# Patient Record
Sex: MALE | Race: ASIAN | NOT HISPANIC OR LATINO | URBAN - METROPOLITAN AREA
[De-identification: names, ages, dates, MRNs, and addresses within clinical notes are randomized per-mention and may not be internally consistent; named-entity substitution may affect disease eponyms.]

---

## 2020-02-21 PROBLEM — Z00.00 ENCOUNTER FOR PREVENTIVE HEALTH EXAMINATION: Status: ACTIVE | Noted: 2020-02-21

## 2020-02-24 ENCOUNTER — INPATIENT (INPATIENT)
Facility: HOSPITAL | Age: 60
LOS: 2 days | Discharge: HOME | DRG: 300 | End: 2020-02-27
Attending: INTERNAL MEDICINE | Admitting: INTERNAL MEDICINE
Payer: COMMERCIAL

## 2020-02-24 VITALS
OXYGEN SATURATION: 98 % | HEIGHT: 71 IN | HEART RATE: 74 BPM | DIASTOLIC BLOOD PRESSURE: 89 MMHG | RESPIRATION RATE: 18 BRPM | TEMPERATURE: 98 F | WEIGHT: 182.98 LBS | SYSTOLIC BLOOD PRESSURE: 161 MMHG

## 2020-02-24 DIAGNOSIS — I82.409 ACUTE EMBOLISM AND THROMBOSIS OF UNSPECIFIED DEEP VEINS OF UNSPECIFIED LOWER EXTREMITY: ICD-10-CM

## 2020-02-24 LAB
ALBUMIN SERPL ELPH-MCNC: 4.3 G/DL — SIGNIFICANT CHANGE UP (ref 3.3–5)
ALP SERPL-CCNC: 146 U/L — HIGH (ref 40–120)
ALT FLD-CCNC: 41 U/L — SIGNIFICANT CHANGE UP (ref 10–45)
ANION GAP SERPL CALC-SCNC: 15 MMOL/L — SIGNIFICANT CHANGE UP (ref 5–17)
APTT BLD: 37 SEC — HIGH (ref 27.5–36.3)
AST SERPL-CCNC: 18 U/L — SIGNIFICANT CHANGE UP (ref 10–40)
BASOPHILS # BLD AUTO: 0.05 K/UL — SIGNIFICANT CHANGE UP (ref 0–0.2)
BASOPHILS NFR BLD AUTO: 0.6 % — SIGNIFICANT CHANGE UP (ref 0–2)
BILIRUB SERPL-MCNC: 0.3 MG/DL — SIGNIFICANT CHANGE UP (ref 0.2–1.2)
BLD GP AB SCN SERPL QL: NEGATIVE — SIGNIFICANT CHANGE UP
BUN SERPL-MCNC: 13 MG/DL — SIGNIFICANT CHANGE UP (ref 7–23)
CALCIUM SERPL-MCNC: 9.8 MG/DL — SIGNIFICANT CHANGE UP (ref 8.4–10.5)
CHLORIDE SERPL-SCNC: 99 MMOL/L — SIGNIFICANT CHANGE UP (ref 96–108)
CO2 SERPL-SCNC: 24 MMOL/L — SIGNIFICANT CHANGE UP (ref 22–31)
CREAT SERPL-MCNC: 0.77 MG/DL — SIGNIFICANT CHANGE UP (ref 0.5–1.3)
EOSINOPHIL # BLD AUTO: 0.2 K/UL — SIGNIFICANT CHANGE UP (ref 0–0.5)
EOSINOPHIL NFR BLD AUTO: 2.4 % — SIGNIFICANT CHANGE UP (ref 0–6)
GLUCOSE SERPL-MCNC: 111 MG/DL — HIGH (ref 70–99)
HCT VFR BLD CALC: 47.1 % — SIGNIFICANT CHANGE UP (ref 39–50)
HGB BLD-MCNC: 14.8 G/DL — SIGNIFICANT CHANGE UP (ref 13–17)
IMM GRANULOCYTES NFR BLD AUTO: 0.2 % — SIGNIFICANT CHANGE UP (ref 0–1.5)
INR BLD: 0.92 RATIO — SIGNIFICANT CHANGE UP (ref 0.88–1.16)
LYMPHOCYTES # BLD AUTO: 1.6 K/UL — SIGNIFICANT CHANGE UP (ref 1–3.3)
LYMPHOCYTES # BLD AUTO: 19.2 % — SIGNIFICANT CHANGE UP (ref 13–44)
MCHC RBC-ENTMCNC: 29.7 PG — SIGNIFICANT CHANGE UP (ref 27–34)
MCHC RBC-ENTMCNC: 31.4 GM/DL — LOW (ref 32–36)
MCV RBC AUTO: 94.4 FL — SIGNIFICANT CHANGE UP (ref 80–100)
MONOCYTES # BLD AUTO: 0.35 K/UL — SIGNIFICANT CHANGE UP (ref 0–0.9)
MONOCYTES NFR BLD AUTO: 4.2 % — SIGNIFICANT CHANGE UP (ref 2–14)
NEUTROPHILS # BLD AUTO: 6.1 K/UL — SIGNIFICANT CHANGE UP (ref 1.8–7.4)
NEUTROPHILS NFR BLD AUTO: 73.4 % — SIGNIFICANT CHANGE UP (ref 43–77)
NRBC # BLD: 0 /100 WBCS — SIGNIFICANT CHANGE UP (ref 0–0)
PLATELET # BLD AUTO: 228 K/UL — SIGNIFICANT CHANGE UP (ref 150–400)
POTASSIUM SERPL-MCNC: 4 MMOL/L — SIGNIFICANT CHANGE UP (ref 3.5–5.3)
POTASSIUM SERPL-SCNC: 4 MMOL/L — SIGNIFICANT CHANGE UP (ref 3.5–5.3)
PROT SERPL-MCNC: 7.4 G/DL — SIGNIFICANT CHANGE UP (ref 6–8.3)
PROTHROM AB SERPL-ACNC: 10.6 SEC — SIGNIFICANT CHANGE UP (ref 10–12.9)
RBC # BLD: 4.99 M/UL — SIGNIFICANT CHANGE UP (ref 4.2–5.8)
RBC # FLD: 13.3 % — SIGNIFICANT CHANGE UP (ref 10.3–14.5)
RH IG SCN BLD-IMP: POSITIVE — SIGNIFICANT CHANGE UP
SODIUM SERPL-SCNC: 138 MMOL/L — SIGNIFICANT CHANGE UP (ref 135–145)
WBC # BLD: 8.32 K/UL — SIGNIFICANT CHANGE UP (ref 3.8–10.5)
WBC # FLD AUTO: 8.32 K/UL — SIGNIFICANT CHANGE UP (ref 3.8–10.5)

## 2020-02-24 PROCEDURE — 99285 EMERGENCY DEPT VISIT HI MDM: CPT

## 2020-02-24 PROCEDURE — 93010 ELECTROCARDIOGRAM REPORT: CPT

## 2020-02-24 PROCEDURE — 71045 X-RAY EXAM CHEST 1 VIEW: CPT | Mod: 26

## 2020-02-24 PROCEDURE — 99253 IP/OBS CNSLTJ NEW/EST LOW 45: CPT

## 2020-02-24 RX ORDER — HEPARIN SODIUM 5000 [USP'U]/ML
INJECTION INTRAVENOUS; SUBCUTANEOUS
Qty: 25000 | Refills: 0 | Status: DISCONTINUED | OUTPATIENT
Start: 2020-02-24 | End: 2020-02-27

## 2020-02-24 RX ADMIN — HEPARIN SODIUM 1500 UNIT(S)/HR: 5000 INJECTION INTRAVENOUS; SUBCUTANEOUS at 23:44
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
## 2020-02-24 NOTE — ED PROVIDER NOTE - PROGRESS NOTE DETAILS
D/w Dr Davenport, requested vascular surgery c/s. Vascular surgery consulted at this time. Images are being delivered to [facility] to upload.

## 2020-02-24 NOTE — ED PROVIDER NOTE - PHYSICAL EXAMINATION
General: Alert and Oriented. No apparent distress.  Head: Normocephalic and atraumatic.  Eyes: PERRLA with EOMI.  Neck: Supple. Trachea midline.   Cardiac: Normal S1 and S2 w/ RRR. No murmurs appreciated. Diminished left radial pulse with intact cap refill  Pulmonary: Vesicular breath sounds bilaterally. No increased WOB. No wheezes or crackles.  Abdominal: Soft, non-tender.   Neurologic: No focal sensory or motor deficits.  Musculoskeletal: Strength appropriate in all 4 extremities for age with no limited ROM. Normal gait. Lower extremities wwp  Skin: Color appropriate for race. Intact, warm, and well-perfused. General: Alert and Oriented. No apparent distress.  Head: Normocephalic and atraumatic.  Eyes: PERRLA with EOMI.  Neck: Supple. Trachea midline.   Cardiac: Normal S1 and S2 w/ RRR. No murmurs appreciated. Diminished left radial pulse with intact cap refill  Pulmonary: Vesicular breath sounds bilaterally. No increased WOB. No wheezes or crackles.  Abdominal: Soft, non-tender.   Neurologic: No focal sensory or motor deficits.  Musculoskeletal: Strength appropriate in all 4 extremities for age with no limited ROM. Normal gait. Lower extremities wwp b/l DP pulses intact 2+  Skin: Color appropriate for race. Intact, warm, and well-perfused.

## 2020-02-24 NOTE — CONSULT NOTE ADULT - EXTREMITIES
23-APR-2018)  ST & T wave abnormality, consider lateral ischemia  Abnormal ECG  When compared with ECG of 06-SEP-2019 22:11,  Sinus rhythm has replaced Atrial fibrillation  Questionable change in initial forces of Septal leads  Nonspecific T wave abnormality no longer evident in Anterior leads    Echo:   Electronically signed by Chi Swan MD (Interpreting   physician) on 07/19/2019 at 04:47 PM   ----------------------------------------------------------------      Findings      Aortic Valve   Aortic valve appears tricuspid.   Thickened aortic valve leaflets noted.   Aortic valve leaflets are Mildly calcified.      Tricuspid Valve   Tricuspid valve is structurally normal.      Pulmonic Valve   The pulmonic valve was not well visualized .   Pulmonic valve is structurally normal.      Left Atrium   Normal size left atrium.      Left Ventricle   Ejection fraction is visually estimated at 50%.  Hypokinetic motion of the apical anteroseptal wall noted in the left   ventricle.      Right Atrium   The right atrium is of normal size.      Pericardial Effusion   No evidence of any pericardial effusion.       Left Heart Cath:   Conclusions      Procedure Summary   Severe multi vessel disease with a  of the Mid LAD presented with a   NSTEMI manifesting as abnormal troponin and diffuse ST depressions.      Recommendations   will admit to the ICU and treat medically with heparin and NG.      Estimated Blood BYVO:95 ml.      Complications:No complications.      Signatures      ----------------------------------------------------------------   Electronically signed by Damian Hylton MD (Performing   Physician) on 07/19/2019       Lab Data:    Cardiac Enzymes:  No results for input(s): CKTOTAL, CKMB, CKMBINDEX, TROPONINI in the last 72 hours.     CBC:   Lab Results   Component Value Date    WBC 17.0 09/09/2019    RBC 2.89 09/09/2019    RBC 4.30 06/06/2012    HGB 9.1 09/09/2019    HCT 27.9 09/09/2019     09/09/2019 detailed exam
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
## 2020-02-24 NOTE — ED PROVIDER NOTE - CLINICAL SUMMARY MEDICAL DECISION MAKING FREE TEXT BOX
Pt p/w abnormal CT scan findings, on exam in NAD despite lack of radial pulses function of left hand intact with good capillary refill. C/f thrombus found on outside CT scan. Plan: labs, vascular c/s. Pt p/w abnormal CT scan findings, on exam in NAD despite lack of radial pulse left wrist function of left hand intact with good capillary refill. C/f thrombus found on outside CT scan. Plan: labs, vascular c/s.

## 2020-02-24 NOTE — ED PROVIDER NOTE - NS ED ROS FT
Gen: No fever, normal appetite  Eyes: No eye irritation or discharge  ENT: No ear pain, congestion, sore throat  Resp: No cough or trouble breathing  Cardiovascular: chest pain  Gastroenteric: No nausea/vomiting, diarrhea, constipation  :  No change in urine output; no dysuria  MS: No joint or muscle pain  Skin: No rashes  Neuro: No headache; no abnormal movements  Remainder negative, except as per the HPI

## 2020-02-24 NOTE — ED CLERICAL - NS ED CLERK NOTE PRE-ARRIVAL INFORMATION; ADDITIONAL PRE-ARRIVAL INFORMATION
CC/Reason For referral:  DVT LEFT GROIN ON IMAGING. STENT THROMBOSIS IN AORTIC ARCH  Preferred Consultant(if applicable):  Who admits for you (if needed):  Do you have documents you would like to fax over?  NO  Would you still like to speak to an ED attending?  PLEASE CALL AFTER PATIENT IS SEEN

## 2020-02-24 NOTE — CONSULT NOTE ADULT - PROBLEM SELECTOR RECOMMENDATION 9
I Lazarus Blank MD performed a history and physical exam of the patient and discussed  the findings and plan with the house officer. I reviewed the resident note and agree with the findings and plan
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
## 2020-02-25 ENCOUNTER — TRANSCRIPTION ENCOUNTER (OUTPATIENT)
Age: 60
End: 2020-02-25

## 2020-02-25 DIAGNOSIS — I82.412 ACUTE EMBOLISM AND THROMBOSIS OF LEFT FEMORAL VEIN: ICD-10-CM

## 2020-02-25 DIAGNOSIS — Z98.890 OTHER SPECIFIED POSTPROCEDURAL STATES: Chronic | ICD-10-CM

## 2020-02-25 DIAGNOSIS — I80.10 PHLEBITIS AND THROMBOPHLEBITIS OF UNSPECIFIED FEMORAL VEIN: ICD-10-CM

## 2020-02-25 DIAGNOSIS — I74.9 EMBOLISM AND THROMBOSIS OF UNSPECIFIED ARTERY: ICD-10-CM

## 2020-02-25 DIAGNOSIS — Z86.19 PERSONAL HISTORY OF OTHER INFECTIOUS AND PARASITIC DISEASES: ICD-10-CM

## 2020-02-25 DIAGNOSIS — K80.20 CALCULUS OF GALLBLADDER WITHOUT CHOLECYSTITIS WITHOUT OBSTRUCTION: ICD-10-CM

## 2020-02-25 DIAGNOSIS — M79.89 OTHER SPECIFIED SOFT TISSUE DISORDERS: ICD-10-CM

## 2020-02-25 LAB
APTT BLD: 121 SEC — CRITICAL HIGH (ref 27.5–36.3)
APTT BLD: 148.1 SEC — CRITICAL HIGH (ref 27.5–36.3)
APTT BLD: 98 SEC — HIGH (ref 27.5–36.3)
HCT VFR BLD CALC: 40.9 % — SIGNIFICANT CHANGE UP (ref 39–50)
HGB BLD-MCNC: 13.1 G/DL — SIGNIFICANT CHANGE UP (ref 13–17)
MCHC RBC-ENTMCNC: 30.1 PG — SIGNIFICANT CHANGE UP (ref 27–34)
MCHC RBC-ENTMCNC: 32 GM/DL — SIGNIFICANT CHANGE UP (ref 32–36)
MCV RBC AUTO: 94 FL — SIGNIFICANT CHANGE UP (ref 80–100)
NRBC # BLD: 0 /100 WBCS — SIGNIFICANT CHANGE UP (ref 0–0)
PLATELET # BLD AUTO: 203 K/UL — SIGNIFICANT CHANGE UP (ref 150–400)
RBC # BLD: 4.35 M/UL — SIGNIFICANT CHANGE UP (ref 4.2–5.8)
RBC # FLD: 13.2 % — SIGNIFICANT CHANGE UP (ref 10.3–14.5)
WBC # BLD: 6.82 K/UL — SIGNIFICANT CHANGE UP (ref 3.8–10.5)
WBC # FLD AUTO: 6.82 K/UL — SIGNIFICANT CHANGE UP (ref 3.8–10.5)

## 2020-02-25 PROCEDURE — 93971 EXTREMITY STUDY: CPT | Mod: 26,59,LT

## 2020-02-25 PROCEDURE — 99232 SBSQ HOSP IP/OBS MODERATE 35: CPT

## 2020-02-25 PROCEDURE — 93970 EXTREMITY STUDY: CPT | Mod: 26

## 2020-02-25 PROCEDURE — 99223 1ST HOSP IP/OBS HIGH 75: CPT

## 2020-02-25 PROCEDURE — 76700 US EXAM ABDOM COMPLETE: CPT | Mod: 26

## 2020-02-25 RX ORDER — TERBINAFINE HCL 250 MG
1 TABLET ORAL
Qty: 0 | Refills: 0 | DISCHARGE

## 2020-02-25 RX ADMIN — HEPARIN SODIUM 0 UNIT(S)/HR: 5000 INJECTION INTRAVENOUS; SUBCUTANEOUS at 06:11

## 2020-02-25 RX ADMIN — HEPARIN SODIUM 1200 UNIT(S)/HR: 5000 INJECTION INTRAVENOUS; SUBCUTANEOUS at 07:13

## 2020-02-25 RX ADMIN — HEPARIN SODIUM 1000 UNIT(S)/HR: 5000 INJECTION INTRAVENOUS; SUBCUTANEOUS at 13:51

## 2020-02-25 RX ADMIN — HEPARIN SODIUM 1000 UNIT(S)/HR: 5000 INJECTION INTRAVENOUS; SUBCUTANEOUS at 20:57

## 2020-02-25 NOTE — H&P ADULT - NSHPPHYSICALEXAM_GEN_ALL_CORE
Vital Signs Last 24 Hrs  T(C): 36.6 (25 Feb 2020 01:19), Max: 36.6 (25 Feb 2020 01:19)  T(F): 97.9 (25 Feb 2020 01:19), Max: 97.9 (25 Feb 2020 01:19)  HR: 67 (25 Feb 2020 01:19) (67 - 74)  BP: 117/71 (25 Feb 2020 01:19) (117/71 - 161/89)  BP(mean): --  RR: 18 (25 Feb 2020 01:19) (16 - 18)  SpO2: 99% (25 Feb 2020 01:19) (98% - 99%)    GENERAL: No acute distress, well-developed  HEAD:  Atraumatic, Normocephalic  EYES: EOMI, PERRLA, conjunctiva and sclera clear  ENT: Oral mucosa moist  NECK: Neck supple  CHEST/LUNG: Clear to auscultation bilaterally; No wheeze, no rales, no rhonchi.    HEART: Regular rate and rhythm; No murmurs, rubs, or gallops  ABDOMEN: Soft, Nontender, Nondistended; Bowel sounds present  EXTREMITIES:  No clubbing, cyanosis. Mild nonpitting edema of L thigh with L thigh circumference>R thigh.   VASCULAR: Posterior tibialis pulses intact bilaterally  PSYCH: Normal behavior, normal affect  NEUROLOGY: AAOx3  SKIN: grossly warm and dry
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
## 2020-02-25 NOTE — PROVIDER CONTACT NOTE (CRITICAL VALUE NOTIFICATION) - ACTION/TREATMENT ORDERED:
FEI David made aware. Following nomogram and rate decreased to 10ml/hr.
Heparin gtt paused for 1hr, to restart at 12cc/hr cont to monitor
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
## 2020-02-25 NOTE — DISCHARGE NOTE PROVIDER - NSDCCPCAREPLAN_GEN_ALL_CORE_FT
PRINCIPAL DISCHARGE DIAGNOSIS  Diagnosis: DVT (deep venous thrombosis)  Assessment and Plan of Treatment: Take medication as prescribed  Follow up with your primary care provider PRINCIPAL DISCHARGE DIAGNOSIS  Diagnosis: DVT (deep venous thrombosis)  Assessment and Plan of Treatment: CTA- stent in appropriate location with subclavian occlusion to the vertebrals with distal reconstitution and L common femoral DVT.   Completed heparin gtt  VA LE doppler 2/25/2020:   No evidence of deep venous thrombosis in either lower extremity.  VA doppler L UE 2/25/2020  No evidence of left upper extremity deep venous thrombosis.  thoracic ELG needs surveillance yearly   Follow up with vascular surgery  Take your "blood thinners" as prescribed.  Walking is encouraged, increase activity as tolerated.  If you develop new pain, swelling, and/or redness contact your healthcare provider.  If you develop new chest pain with difficulty breathing, a rapid heart rate and/or a feeling of passing out call emergency medical services 911. PRINCIPAL DISCHARGE DIAGNOSIS  Diagnosis: DVT (deep venous thrombosis)  Assessment and Plan of Treatment: Start eliquis as prescribed  Follow up with Dr Davenport this week for evaluation and extended prescription  Follow up with vascular surgery  Walking is encouraged, increase activity as tolerated.  If you develop new pain, swelling, and/or redness contact your healthcare provider.  If you develop new chest pain with difficulty breathing, a rapid heart rate and/or a feeling of passing out call emergency medical services 911.
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
## 2020-02-25 NOTE — PROGRESS NOTE ADULT - ATTENDING COMMENTS
I Lazarus Blank MD performed a history and physical exam of the patient and discussed  the findings and plan with the house officer. I reviewed the resident note and agree with the findings and plan I Lazarus Blank MD have personally  seen and examined the patient today and have noted the findings and formulated the plan of care.  I Lazarus Blank MD have personally seen and examined the patient at bedside today at  9am

## 2020-02-25 NOTE — DISCHARGE NOTE PROVIDER - CARE PROVIDER_API CALL
Neil Davenport (DO)  Internal Medicine  [address] [address]  Phone: [phone]  Fax: [phone]  Follow Up Time: Neil Davenport (DO)  Internal Medicine  [address] [facility], [address]  [place] [address]  Phone: [phone]  Fax: [phone]  Follow Up Time:     Lazarus Blank)  Vascular Surgery  [address] [facility], [address] [address]  Phone: [phone]  Fax: [phone]  Follow Up Time:

## 2020-02-25 NOTE — PROGRESS NOTE ADULT - PROBLEM SELECTOR PLAN 3
check abdominal sono  no symptoms at present  if neg can discharge once vascular issues addressed, with outpatient workup, and possible HIDA at that time.

## 2020-02-25 NOTE — DISCHARGE NOTE PROVIDER - NSDCFUSCHEDAPPT_GEN_ALL_CORE_FT
FATOUMATA MONAHAN ; 03/03/2020 ; [facility] [address]  MONAHAN, FATOUMATA ; 03/03/2020 ; [facility] [address]

## 2020-02-25 NOTE — PROGRESS NOTE ADULT - ATTENDING COMMENTS
admit to inpatient  possible discharge later today?  await final Vascular surgery attending input    d/w/ NP    Neil Davenport,   internal medicine
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
## 2020-02-25 NOTE — DISCHARGE NOTE PROVIDER - PROVIDER TOKENS
PROVIDER:[TOKEN:[[id number]]] PROVIDER:[TOKEN:[[id number]]],PROVIDER:[TOKEN:[[id number]]]

## 2020-02-25 NOTE — DISCHARGE NOTE PROVIDER - CARE PROVIDERS DIRECT ADDRESSES
,DirectAddress_Unknown ,DirectAddress_Unknown,[email or web address]

## 2020-02-25 NOTE — DISCHARGE NOTE PROVIDER - NSDCMRMEDTOKEN_GEN_ALL_CORE_FT
terbinafine: 1 tab(s) orally once a day apixaban 5 mg oral tablet: 2 tab(s) orally every 12 hours  terbinafine: 1 tab(s) orally once a day

## 2020-02-25 NOTE — PROGRESS NOTE ADULT - SUBJECTIVE AND OBJECTIVE BOX
Patient is a 59y old  Male who presents with a chief complaint of Subclavian artery thrombus, left common femoral dvt (25 Feb 2020 14:29)      Vascular Surgery Attending Progress Note    Interval HPI: pt w/o new c/o     Medications:  heparin  Infusion.  Unit(s)/Hr IV Continuous <Continuous>      Vital Signs Last 24 Hrs  T(C): 36.3 (25 Feb 2020 11:37), Max: 36.8 (25 Feb 2020 06:16)  T(F): 97.4 (25 Feb 2020 11:37), Max: 98.2 (25 Feb 2020 06:16)  HR: 66 (25 Feb 2020 11:37) (66 - 74)  BP: 115/71 (25 Feb 2020 11:37) (101/60 - 161/89)  BP(mean): --  RR: 19 (25 Feb 2020 11:37) (16 - 19)  SpO2: 99% (25 Feb 2020 11:37) (96% - 99%)  I&O's Summary      Physical Exam:  Neuro  A&Ox3 VSS  Vascular:  lue and speedy le edema remains     LABS:                        13.1   6.82  )-----------( 203      ( 25 Feb 2020 05:20 )             40.9     02-24    138  |  99  |  13  ----------------------------<  111<H>  4.0   |  24  |  0.77    Ca    9.8      24 Feb 2020 20:45    TPro  7.4  /  Alb  4.3  /  TBili  0.3  /  DBili  x   /  AST  18  /  ALT  41  /  AlkPhos  146<H>  02-24    PT/INR - ( 24 Feb 2020 20:45 )   PT: 10.6 sec;   INR: 0.92 ratio         PTT - ( 25 Feb 2020 13:01 )  PTT:121.0 sec    JOSH LEOS MD  [phone]

## 2020-02-26 LAB
APTT BLD: 101.5 SEC — HIGH (ref 27.5–36.3)
APTT BLD: 64.2 SEC — HIGH (ref 27.5–36.3)
APTT BLD: 68.2 SEC — HIGH (ref 27.5–36.3)
HCT VFR BLD CALC: 43.4 % — SIGNIFICANT CHANGE UP (ref 39–50)
HCV AB S/CO SERPL IA: 0.13 S/CO — SIGNIFICANT CHANGE UP (ref 0–0.99)
HCV AB SERPL-IMP: SIGNIFICANT CHANGE UP
HGB BLD-MCNC: 13.4 G/DL — SIGNIFICANT CHANGE UP (ref 13–17)
MCHC RBC-ENTMCNC: 29.4 PG — SIGNIFICANT CHANGE UP (ref 27–34)
MCHC RBC-ENTMCNC: 30.9 GM/DL — LOW (ref 32–36)
MCV RBC AUTO: 95.2 FL — SIGNIFICANT CHANGE UP (ref 80–100)
NRBC # BLD: 0 /100 WBCS — SIGNIFICANT CHANGE UP (ref 0–0)
PLATELET # BLD AUTO: 190 K/UL — SIGNIFICANT CHANGE UP (ref 150–400)
RBC # BLD: 4.56 M/UL — SIGNIFICANT CHANGE UP (ref 4.2–5.8)
RBC # FLD: 13.5 % — SIGNIFICANT CHANGE UP (ref 10.3–14.5)
WBC # BLD: 5.41 K/UL — SIGNIFICANT CHANGE UP (ref 3.8–10.5)
WBC # FLD AUTO: 5.41 K/UL — SIGNIFICANT CHANGE UP (ref 3.8–10.5)

## 2020-02-26 PROCEDURE — 99232 SBSQ HOSP IP/OBS MODERATE 35: CPT

## 2020-02-26 PROCEDURE — 71275 CT ANGIOGRAPHY CHEST: CPT | Mod: 26

## 2020-02-26 PROCEDURE — 74177 CT ABD & PELVIS W/CONTRAST: CPT | Mod: 26

## 2020-02-26 RX ADMIN — HEPARIN SODIUM 800 UNIT(S)/HR: 5000 INJECTION INTRAVENOUS; SUBCUTANEOUS at 11:55

## 2020-02-26 RX ADMIN — HEPARIN SODIUM 800 UNIT(S)/HR: 5000 INJECTION INTRAVENOUS; SUBCUTANEOUS at 18:19

## 2020-02-26 RX ADMIN — HEPARIN SODIUM 800 UNIT(S)/HR: 5000 INJECTION INTRAVENOUS; SUBCUTANEOUS at 04:05

## 2020-02-26 NOTE — PROGRESS NOTE ADULT - SUBJECTIVE AND OBJECTIVE BOX
Patient is a 59y old  Male who presents with a chief complaint of Subclavian artery thrombus, left common femoral dvt (26 Feb 2020 07:52)      Vascular Surgery Attending Progress Note    Interval HPI: pt w/o new c/o     Medications:  heparin  Infusion.  Unit(s)/Hr IV Continuous <Continuous>      Vital Signs Last 24 Hrs  T(C): 36 (26 Feb 2020 03:43), Max: 36.6 (25 Feb 2020 18:11)  T(F): 96.8 (26 Feb 2020 03:43), Max: 97.9 (25 Feb 2020 18:11)  HR: 66 (26 Feb 2020 03:43) (66 - 73)  BP: 104/63 (26 Feb 2020 03:43) (100/60 - 116/76)  BP(mean): --  RR: 18 (26 Feb 2020 03:43) (18 - 19)  SpO2: 97% (26 Feb 2020 03:43) (97% - 99%)  I&O's Summary    25 Feb 2020 07:01  -  26 Feb 2020 07:00  --------------------------------------------------------  IN: 478 mL / OUT: 0 mL / NET: 478 mL        Physical Exam:  Neuro  A&Ox3 VSS  Vascular:  speedy le and lue edema stable     LABS:                        13.4   5.41  )-----------( 190      ( 26 Feb 2020 07:03 )             43.4     02-24    138  |  99  |  13  ----------------------------<  111<H>  4.0   |  24  |  0.77    Ca    9.8      24 Feb 2020 20:45    TPro  7.4  /  Alb  4.3  /  TBili  0.3  /  DBili  x   /  AST  18  /  ALT  41  /  AlkPhos  146<H>  02-24    PT/INR - ( 24 Feb 2020 20:45 )   PT: 10.6 sec;   INR: 0.92 ratio         PTT - ( 26 Feb 2020 03:43 )  PTT:101.5 sec    JOSH LEOS MD  [phone]

## 2020-02-26 NOTE — PROGRESS NOTE ADULT - ATTENDING COMMENTS
I Lazarus Blank MD have personally  seen and examined the patient today and have noted the findings and formulated the plan of care.  I Lazarus Blank MD have personally seen and examined the patient at bedside today at  830am
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
## 2020-02-26 NOTE — CHART NOTE - NSCHARTNOTEFT_GEN_A_CORE
Duplex results of B/l LE and LUE negative for DVT. Please f/u outpatient with his vascular surgeon in 1-2 weeks.    Discussed with Dr. Blank    Vascular Surgery   [id number]

## 2020-02-26 NOTE — PROGRESS NOTE ADULT - SUBJECTIVE AND OBJECTIVE BOX
seen and examined. no fever or chills. denies SOB or CP. no HA. requesting left scalp sututres to be removed. no abd pain.     MEDICATIONS  (STANDING):  heparin  Infusion.  Unit(s)/Hr (15 mL/Hr) IV Continuous <Continuous>    MEDICATIONS  (PRN):      Allergies    No Known Allergies    Intolerances    Vital Signs Last 24 Hrs  T(C): 36 (26 Feb 2020 03:43), Max: 36.6 (25 Feb 2020 18:11)  T(F): 96.8 (26 Feb 2020 03:43), Max: 97.9 (25 Feb 2020 18:11)  HR: 66 (26 Feb 2020 03:43) (66 - 73)  BP: 104/63 (26 Feb 2020 03:43) (100/60 - 116/76)  BP(mean): --  RR: 18 (26 Feb 2020 03:43) (18 - 19)  SpO2: 97% (26 Feb 2020 03:43) (97% - 99%)    PHYSICAL EXAM:    GENERAL: NAD, well-groomed, well-developed  HEAD:  + left scalp staples in place. no tenderness, c/d/i.   EYES: EOMI, PERRLA, conjunctiva and sclera clear  NECK: Supple, No JVD  NERVOUS SYSTEM:  Alert & Oriented X3, Good concentration; Motor Strength 5/5 B/L upper and lower extremities; DTRs 2+ intact and symmetric  CHEST/LUNG: Clear to auscultation bilaterally; No rales, rhonchi, wheezing, or rubs  HEART: Regular rate and rhythm; No murmurs, rubs, or gallops  ABDOMEN: Soft, Nontender, Nondistended; Bowel sounds present  EXTREMITIES:  No clubbing, cyanosis, or edema  LYMPH: No lymphadenopathy noted  SKIN: No rashes or lesions      LABS:                        13.4   5.41  )-----------( 190      ( 26 Feb 2020 07:03 )             43.4     02-24    138  |  99  |  13  ----------------------------<  111<H>  4.0   |  24  |  0.77    Ca    9.8      24 Feb 2020 20:45    TPro  7.4  /  Alb  4.3  /  TBili  0.3  /  DBili  x   /  AST  18  /  ALT  41  /  AlkPhos  146<H>  02-24    PT/INR - ( 24 Feb 2020 20:45 )   PT: 10.6 sec;   INR: 0.92 ratio         PTT - ( 26 Feb 2020 03:43 )  PTT:101.5 sec      RADIOLOGY & ADDITIONAL STUDIES:  Dopplers done, awaiting report
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
## 2020-02-26 NOTE — PROGRESS NOTE ADULT - ATTENDING COMMENTS
possible discharge later today?  await final Vascular surgery attending input  Please remove staples from left scalp prior to discharge.   outpatient follow up with me in 1-2 weeks.     d/w/ NP    Neil Davenport DO  internal medicine

## 2020-02-27 ENCOUNTER — TRANSCRIPTION ENCOUNTER (OUTPATIENT)
Age: 60
End: 2020-02-27

## 2020-02-27 VITALS
TEMPERATURE: 98 F | DIASTOLIC BLOOD PRESSURE: 85 MMHG | HEART RATE: 75 BPM | OXYGEN SATURATION: 97 % | RESPIRATION RATE: 18 BRPM | SYSTOLIC BLOOD PRESSURE: 126 MMHG

## 2020-02-27 LAB
APTT BLD: 46.1 SEC — HIGH (ref 27.5–36.3)
HCT VFR BLD CALC: 43.7 % — SIGNIFICANT CHANGE UP (ref 39–50)
HGB BLD-MCNC: 13.6 G/DL — SIGNIFICANT CHANGE UP (ref 13–17)
MCHC RBC-ENTMCNC: 29.4 PG — SIGNIFICANT CHANGE UP (ref 27–34)
MCHC RBC-ENTMCNC: 31.1 GM/DL — LOW (ref 32–36)
MCV RBC AUTO: 94.4 FL — SIGNIFICANT CHANGE UP (ref 80–100)
NRBC # BLD: 0 /100 WBCS — SIGNIFICANT CHANGE UP (ref 0–0)
PLATELET # BLD AUTO: 197 K/UL — SIGNIFICANT CHANGE UP (ref 150–400)
RBC # BLD: 4.63 M/UL — SIGNIFICANT CHANGE UP (ref 4.2–5.8)
RBC # FLD: 13.4 % — SIGNIFICANT CHANGE UP (ref 10.3–14.5)
WBC # BLD: 5.12 K/UL — SIGNIFICANT CHANGE UP (ref 3.8–10.5)
WBC # FLD AUTO: 5.12 K/UL — SIGNIFICANT CHANGE UP (ref 3.8–10.5)

## 2020-02-27 PROCEDURE — 86901 BLOOD TYPING SEROLOGIC RH(D): CPT

## 2020-02-27 PROCEDURE — 71275 CT ANGIOGRAPHY CHEST: CPT

## 2020-02-27 PROCEDURE — 86803 HEPATITIS C AB TEST: CPT

## 2020-02-27 PROCEDURE — 99285 EMERGENCY DEPT VISIT HI MDM: CPT

## 2020-02-27 PROCEDURE — 76700 US EXAM ABDOM COMPLETE: CPT

## 2020-02-27 PROCEDURE — 93971 EXTREMITY STUDY: CPT

## 2020-02-27 PROCEDURE — 71045 X-RAY EXAM CHEST 1 VIEW: CPT

## 2020-02-27 PROCEDURE — 99238 HOSP IP/OBS DSCHRG MGMT 30/<: CPT

## 2020-02-27 PROCEDURE — 85730 THROMBOPLASTIN TIME PARTIAL: CPT

## 2020-02-27 PROCEDURE — 85027 COMPLETE CBC AUTOMATED: CPT

## 2020-02-27 PROCEDURE — 85610 PROTHROMBIN TIME: CPT

## 2020-02-27 PROCEDURE — 86900 BLOOD TYPING SEROLOGIC ABO: CPT

## 2020-02-27 PROCEDURE — 93005 ELECTROCARDIOGRAM TRACING: CPT

## 2020-02-27 PROCEDURE — 86850 RBC ANTIBODY SCREEN: CPT

## 2020-02-27 PROCEDURE — 93970 EXTREMITY STUDY: CPT

## 2020-02-27 PROCEDURE — 36415 COLL VENOUS BLD VENIPUNCTURE: CPT

## 2020-02-27 PROCEDURE — 80053 COMPREHEN METABOLIC PANEL: CPT

## 2020-02-27 PROCEDURE — 74177 CT ABD & PELVIS W/CONTRAST: CPT

## 2020-02-27 RX ORDER — APIXABAN 2.5 MG/1
10 TABLET, FILM COATED ORAL EVERY 12 HOURS
Refills: 0 | Status: DISCONTINUED | OUTPATIENT
Start: 2020-02-27 | End: 2020-02-27

## 2020-02-27 RX ORDER — APIXABAN 2.5 MG/1
2 TABLET, FILM COATED ORAL
Qty: 28 | Refills: 0
Start: 2020-02-27 | End: 2020-03-04

## 2020-02-27 RX ADMIN — APIXABAN 10 MILLIGRAM(S): 2.5 TABLET, FILM COATED ORAL at 11:27

## 2020-02-27 NOTE — PROGRESS NOTE ADULT - PROBLEM SELECTOR PROBLEM 2
Arm swelling
Arm swelling
Arterial thromboembolism

## 2020-02-27 NOTE — PROGRESS NOTE ADULT - SUBJECTIVE AND OBJECTIVE BOX
seen and examined. Patient had documented left common femoral vein DVT on outside CT. LE doppler negative for DVT in hospital. after discussion with vascular Attending decision to check CTA to rule out proximal migration. patient states he overall feels well. no SOB or CP. no leg pain.     MEDICATIONS  (STANDING):  heparin  Infusion.  Unit(s)/Hr (15 mL/Hr) IV Continuous <Continuous>    MEDICATIONS  (PRN):      Allergies    No Known Allergies    Intolerances        Vital Signs Last 24 Hrs  T(C): 36.8 (27 Feb 2020 05:57), Max: 36.8 (27 Feb 2020 05:57)  T(F): 98.2 (27 Feb 2020 05:57), Max: 98.2 (27 Feb 2020 05:57)  HR: 63 (27 Feb 2020 05:57) (63 - 78)  BP: 100/64 (27 Feb 2020 05:57) (100/64 - 128/74)  BP(mean): --  RR: 16 (27 Feb 2020 05:57) (16 - 18)  SpO2: 97% (27 Feb 2020 05:57) (97% - 99%)    PHYSICAL EXAM:    GENERAL: NAD, well-groomed, well-developed  HEAD:  Atraumatic, Normocephalic  EYES: EOMI, PERRLA, conjunctiva and sclera clear  NECK: Supple, No JVD  NERVOUS SYSTEM:  Alert & Oriented X3, Good concentration; Motor Strength 5/5 B/L upper and lower extremities; DTRs 2+ intact and symmetric  CHEST/LUNG: Clear to auscultation bilaterally; No rales, rhonchi, wheezing, or rubs  HEART: Regular rate and rhythm; No murmurs, rubs, or gallops  ABDOMEN: Soft, Nontender, Nondistended; Bowel sounds present  EXTREMITIES:  2+ Peripheral Pulses, No clubbing, cyanosis, or edema  LYMPH: No lymphadenopathy noted  SKIN: No rashes or lesions      LABS:                        13.6   5.12  )-----------( 197      ( 27 Feb 2020 06:59 )             43.7           PTT - ( 26 Feb 2020 17:52 )  PTT:68.2 sec      RADIOLOGY & ADDITIONAL STUDIES:  prelim CTA - no PE, + left common iliac vein DVT

## 2020-02-27 NOTE — PROGRESS NOTE ADULT - ATTENDING COMMENTS
MDO received for hhc services.  Updated orders sent to pt's hhc agency, [facility] 78 [phone].    JM olvera [id number] await official CT results and decision to start oral AC.  discharge pending CT results.     Neil Davenport,   internal medicine

## 2020-02-27 NOTE — PROGRESS NOTE ADULT - PROBLEM SELECTOR PLAN 3
abdominal sono reviewed with biliary sludge. outpatient follow up with GI.  no symptoms at present  outpatient management

## 2020-02-27 NOTE — PROGRESS NOTE ADULT - REASON FOR ADMISSION
Subclavian artery thrombus, left common femoral dvt
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
## 2020-02-27 NOTE — PROGRESS NOTE ADULT - PROBLEM SELECTOR PLAN 2
LAUREN Blank MD have personally  seen and examined the patient today and have noted the findings and formulated the plan of care.
in situ thrombus at distal aortic arch stent.   vascular following, appreciate recs, +reconstitution distally.
in situ thrombus at distal aortic arch stent.   vascular following, appreciate recs, +reconstitution distally.  no further intervention at this time.  annual follow up needed
in situ thrombus at distal aortic arch stent.   vascular following, to indicate if need for any further change in management at this time.
LAUREN Blank MD have personally  seen and examined the patient today and have noted the findings and formulated the plan of care.

## 2020-02-27 NOTE — PROGRESS NOTE ADULT - PROBLEM SELECTOR PLAN 1
LAUREN Blank MD have personally  seen and examined the patient today and have noted the findings and formulated the plan of care.
continue heparin ggt for now.   await official CT read - if confirmed common iliac vein DVT switch to eliquis 10mg bid x 7 days then 5mg bid.  discussed with vascular attending yesterday
continue heparin ggt for now.   vascular to indicate NOAC to switch to  await doppler results
continue heparin ggt for now.   vascular to indicate need for IVC filter or change to NOAC?
LAUREN Blank MD have personally  seen and examined the patient today and have noted the findings and formulated the plan of care.

## 2020-02-27 NOTE — DISCHARGE NOTE NURSING/CASE MANAGEMENT/SOCIAL WORK - PATIENT PORTAL LINK FT
You can access the FollowMyHealth Patient Portal offered by [facility] by registering at the following website: [email or web address]. By joining [facility]’s FollowMyHealth portal, you will also be able to view your health information using other applications (apps) compatible with our system.

## 2020-02-27 NOTE — CHART NOTE - NSCHARTNOTEFT_GEN_A_CORE
Vascular Surgey Att Note  d/w med attending  2/26/2020 re the CT  finfing of lle dvt and the lle US finding of lle neg for dvt  plan proceeded a CTPA and repeat CT abd/pelvis   CTPA and CT Abd/pelvis redo findings reviewed  blanca luevano  f/u  outpt in 1 mo w US abd and lle   above d/w pt via cell [phone]

## 2020-03-03 ENCOUNTER — APPOINTMENT (OUTPATIENT)
Dept: VASCULAR SURGERY | Facility: CLINIC | Age: 60
End: 2020-03-03

## 2020-03-10 PROBLEM — Z86.19 PERSONAL HISTORY OF OTHER INFECTIOUS AND PARASITIC DISEASES: Chronic | Status: ACTIVE | Noted: 2020-02-25

## 2020-03-10 PROBLEM — Z86.79 PERSONAL HISTORY OF OTHER DISEASES OF THE CIRCULATORY SYSTEM: Chronic | Status: ACTIVE | Noted: 2020-02-25

## 2020-05-12 ENCOUNTER — APPOINTMENT (OUTPATIENT)
Dept: VASCULAR SURGERY | Facility: CLINIC | Age: 60
End: 2020-05-12

## 2021-04-12 NOTE — H&P ADULT - PROBLEM/PLAN-1
April 12, 2021       TO: Lisa Scott  [address]  [place] [address]         Dear Lisa,    I wanted to provide you with a copy of your referral. Please review and contact myself or Souk as to where you would like your referral sent.        [facility]  [address] [place] [address]  [address]  [place], [place] [address]  [phone]     [facility]  [address] [facility]  [address]  [place] [address]  Phone:  [phone]  Fax: [phone]  Hours:  Monday - Friday 8:30 - 5:00pm     [facility]  [address]  [place] [address]  [phone]                        [facility]  [facility],  [place], [place] [address] [phone]  Fax  [phone]      Sincerely,      Lizbeth Butt CMA, Care Coordinator  Direct Phone: [phone]                                       DISPLAY PLAN FREE TEXT

## 2021-04-26 ENCOUNTER — APPOINTMENT (OUTPATIENT)
Dept: VASCULAR SURGERY | Facility: CLINIC | Age: 61
End: 2021-04-26
Payer: COMMERCIAL

## 2021-04-26 VITALS
SYSTOLIC BLOOD PRESSURE: 119 MMHG | HEIGHT: 72 IN | BODY MASS INDEX: 24.65 KG/M2 | WEIGHT: 182 LBS | DIASTOLIC BLOOD PRESSURE: 69 MMHG | HEART RATE: 62 BPM | TEMPERATURE: 96.6 F

## 2021-04-26 PROCEDURE — 99214 OFFICE O/P EST MOD 30 MIN: CPT

## 2021-04-26 PROCEDURE — 93880 EXTRACRANIAL BILAT STUDY: CPT

## 2021-04-26 PROCEDURE — 99072 ADDL SUPL MATRL&STAF TM PHE: CPT

## 2021-04-26 NOTE — DATA REVIEWED
[FreeTextEntry1] : 4/26/2021 Carotid Duplex  Rt ICA  less 50% stenosis  by velocity criteria\par                          Lt  ICA  less 50% stenosis  by velocity criteria\par                          Right Ant Vertebral Arterial Flow \par                          sig for Lt VA reversal of flow \par \par

## 2021-04-26 NOTE — HISTORY OF PRESENT ILLNESS
[FreeTextEntry1] : pt was seen as in pt  early 2020 at [facility]\par pt underwent a TA inj  TEVAR repair previously \par cta thorax no el  sig for  lt sa covered\par pt  exercises on a daily basis and states no ue c/o

## 2021-04-26 NOTE — ASSESSMENT
[FreeTextEntry1] : Impression asymptomatic  left SA steal s/p TA inj  TEVAR \par \par \par Plan Med Conservative management \par carotid duplex s/o  lt SA steal 12mo april 2022\par d/w pt CTA of Thorax  s/o EL next avail then telehealth \par \par  [Arterial/Venous Disease] : arterial/venous disease

## 2021-04-26 NOTE — PHYSICAL EXAM
[1+] : left 1+ [2+] : left 2+ [No HSM] : no hepatosplenomegaly [Alert] : alert [Oriented to Person] : oriented to person [Oriented to Place] : oriented to place [Oriented to Time] : oriented to time [Calm] : calm [JVD] : no jugular venous distention  [Ankle Swelling (On Exam)] : not present [Varicose Veins Of Lower Extremities] : not present [] : not present [Abdomen Masses] : No abdominal masses [Tender] : was nontender [Stool Sample Taken] : No stool obtained  on rectal exam [de-identified] : nad [de-identified] : wml [FreeTextEntry1] : abd soft  [de-identified] : wml [de-identified] : wml [de-identified] : Ck Cranial nerves 2-12 ck grossly intact [de-identified] : cooperative

## 2021-05-04 ENCOUNTER — RESULT REVIEW (OUTPATIENT)
Age: 61
End: 2021-05-04

## 2021-05-04 ENCOUNTER — OUTPATIENT (OUTPATIENT)
Dept: OUTPATIENT SERVICES | Facility: HOSPITAL | Age: 61
LOS: 1 days | End: 2021-05-04
Payer: COMMERCIAL

## 2021-05-04 ENCOUNTER — APPOINTMENT (OUTPATIENT)
Dept: CT IMAGING | Facility: IMAGING CENTER | Age: 61
End: 2021-05-04

## 2021-05-04 DIAGNOSIS — Z00.8 ENCOUNTER FOR OTHER GENERAL EXAMINATION: ICD-10-CM

## 2021-05-04 DIAGNOSIS — Z98.890 OTHER SPECIFIED POSTPROCEDURAL STATES: Chronic | ICD-10-CM

## 2021-05-04 PROCEDURE — 71275 CT ANGIOGRAPHY CHEST: CPT | Mod: 26

## 2021-05-04 PROCEDURE — 71275 CT ANGIOGRAPHY CHEST: CPT

## 2021-05-12 ENCOUNTER — APPOINTMENT (OUTPATIENT)
Dept: VASCULAR SURGERY | Facility: CLINIC | Age: 61
End: 2021-05-12
Payer: COMMERCIAL

## 2021-05-12 PROCEDURE — 99442: CPT | Mod: 95

## 2021-05-12 NOTE — DATA REVIEWED
[FreeTextEntry1] : 4/26/2021 Carotid Duplex  Rt ICA  less 50% stenosis  by velocity criteria\par                          Lt  ICA  less 50% stenosis  by velocity criteria\par                          Right Ant Vertebral Arterial Flow \par                          sig for Lt VA reversal of flow \par \par 5/4/2021 CTA Thorax reviewed no el  sig for lt sa coverage and lt SA steal

## 2021-05-12 NOTE — PHYSICAL EXAM
[Alert] : alert [Oriented to Person] : oriented to person [Oriented to Place] : oriented to place [Oriented to Time] : oriented to time [Calm] : calm [Stool Sample Taken] : No stool obtained  on rectal exam [de-identified] : wml [de-identified] : nad [de-identified] : no resp distress [FreeTextEntry1] :  Physical exam and extremities exam findings via telehealth video review with patient\par \par \par  [de-identified] : cooperative [de-identified] : Ck Cranial nerves 2-12 ck grossly intact

## 2021-05-12 NOTE — ASSESSMENT
[Arterial/Venous Disease] : arterial/venous disease [FreeTextEntry1] : Impression asymptomatic  left SA steal s/p TA inj  TEVAR \par \par \par Plan Med Conservative management \par if neck c/o continue  d/w pt spine surg eval \par d/w pt CT of thorax w and w/o contrast surveillance in 2-3 years\par carotid duplex s/o  lt SA steal 12mo april 2022\par telehealth visit in 6 mo to re eval \par Telehealth visit  time duration  22  min\par \par \par \par \par

## 2021-05-12 NOTE — HISTORY OF PRESENT ILLNESS
[FreeTextEntry1] : pt was seen as in pt  early 2020 at [facility]\par pt underwent a TA inj  TEVAR repair previously \par cta thorax no el  sig for  lt sa covered\par pt  exercises on a daily basis and states no ue c/o  [de-identified] : pt states no lue or cerebrovasc c/o \par pt c/o some neck stiffness w movement and exercise activities

## 2021-05-12 NOTE — REASON FOR VISIT
[Home] : at home, [unfilled] , at the time of the visit. [Medical Office: ([facility])___] : at the medical office located in  [Other:____] : [unfilled] [Verbal consent obtained from patient] : the patient, [unfilled] [FreeTextEntry1] : i had a  thoracic aortic endo repair

## 2021-05-14 ENCOUNTER — NON-APPOINTMENT (OUTPATIENT)
Age: 61
End: 2021-05-14

## 2021-05-14 RX ORDER — RIFAXIMIN 550 MG/1
550 TABLET ORAL
Qty: 42 | Refills: 0 | Status: ACTIVE | COMMUNITY
Start: 2021-05-05

## 2021-05-14 RX ORDER — DICYCLOMINE HYDROCHLORIDE 10 MG/1
10 CAPSULE ORAL
Qty: 90 | Refills: 0 | Status: ACTIVE | COMMUNITY
Start: 2021-04-27

## 2021-05-14 RX ORDER — POLYETHYLENE GLYCOL 3350, SODIUM SULFATE, SODIUM CHLORIDE, POTASSIUM CHLORIDE, ASCORBIC ACID, SODIUM ASCORBATE 140-9-5.2G
140 KIT ORAL
Qty: 3 | Refills: 0 | Status: ACTIVE | COMMUNITY
Start: 2020-12-30

## 2021-07-26 ENCOUNTER — LABORATORY RESULT (OUTPATIENT)
Age: 61
End: 2021-07-26

## 2021-07-26 ENCOUNTER — APPOINTMENT (OUTPATIENT)
Dept: PULMONOLOGY | Facility: CLINIC | Age: 61
End: 2021-07-26
Payer: COMMERCIAL

## 2021-07-26 VITALS
HEART RATE: 76 BPM | HEIGHT: 72 IN | DIASTOLIC BLOOD PRESSURE: 69 MMHG | WEIGHT: 180 LBS | RESPIRATION RATE: 18 BRPM | SYSTOLIC BLOOD PRESSURE: 116 MMHG | BODY MASS INDEX: 24.38 KG/M2 | TEMPERATURE: 98.2 F | OXYGEN SATURATION: 94 %

## 2021-07-26 DIAGNOSIS — Z86.79 OTHER SPECIFIED POSTPROCEDURAL STATES: ICD-10-CM

## 2021-07-26 DIAGNOSIS — Z23 ENCOUNTER FOR IMMUNIZATION: ICD-10-CM

## 2021-07-26 DIAGNOSIS — Z98.890 OTHER SPECIFIED POSTPROCEDURAL STATES: ICD-10-CM

## 2021-07-26 DIAGNOSIS — R91.1 SOLITARY PULMONARY NODULE: ICD-10-CM

## 2021-07-26 DIAGNOSIS — G45.8 OTHER TRANSIENT CEREBRAL ISCHEMIC ATTACKS AND RELATED SYNDROMES: ICD-10-CM

## 2021-07-26 PROCEDURE — 99204 OFFICE O/P NEW MOD 45 MIN: CPT | Mod: 25

## 2021-07-26 PROCEDURE — 36415 COLL VENOUS BLD VENIPUNCTURE: CPT

## 2021-07-26 PROCEDURE — 94727 GAS DIL/WSHOT DETER LNG VOL: CPT

## 2021-07-26 PROCEDURE — 94010 BREATHING CAPACITY TEST: CPT

## 2021-07-26 PROCEDURE — ZZZZZ: CPT

## 2021-07-26 PROCEDURE — 94729 DIFFUSING CAPACITY: CPT

## 2021-07-26 PROCEDURE — 88738 HGB QUANT TRANSCUTANEOUS: CPT

## 2021-07-26 NOTE — PROCEDURE
[FreeTextEntry1] : Pulmonary Function Test performed in my office today: Lung Volume: Within normal limits; Spirometry: Within normal limits; Diffusion: Within normal limits.\par \par  CT Angio Chest w/wo IV Cont             Final\par \par \par   EXAM:  CT ANGIO CHEST (W)AW IC\par \par \par PROCEDURE DATE:  05/04/2021\par \par \par \par INTERPRETATION:  EXAMINATION: CT ANGIO CHEST WITHOUT AND OR WITH IV CONTRAST\par \par CLINICAL INDICATION: Aortic repair.\par \par TECHNIQUE: CTA of the chest was obtained before and after the administration of 90 ml of Omnipaque 350, 10 ml was discarded.  MIP Images were reconstructed.\par \par COMPARISON: 2/26/2020.\par \par FINDINGS:\par \par AIRWAYS AND LUNGS: The central tracheobronchial tree is patent.  Dependent reticular opacity and mild bronchiectasis is unchanged. Unchanged 20 x 8 mm nodule along left heart border (3, 57).\par \par MEDIASTINUM AND PLEURA: There are no enlarged mediastinal, hilar or axillary lymph nodes. The visualized portion of the thyroid gland is unremarkable. There is no pleural effusion. There is no pneumothorax.\par \par HEART AND VESSELS: The heart is normal in size.  Descending thoracic aortic repair.  There is no pericardial effusion.  Lack of contrast within the proximal left subclavian artery with reconstitution distal to the vertebral artery.\par \par UPPER ABDOMEN: Images of the upper abdomen demonstrate no abnormality.\par \par BONES AND SOFT TISSUES: The bones are unremarkable.  The soft tissues are unremarkable.\par \par TUBES/LINES: None.\par \par IMPRESSION:\par Descending thoracic aortic repair.  Lack of contrast within the proximal left subclavian artery with reconstitution distal to the vertebral artery suggests subclavian steal syndrome.\par \par Unchanged 20 x 8 mm nodule along left heart border without definite enhancement may be within the pleura or pericardium. Continued follow-up CT recommended.\par \par \par \par \par \par \par LEDY KAYE MD; Attending Radiologist\par This document has been electronically signed. May  5 2021  6:20PM\par \par  \par \par  Ordered by: JOSH LEOS       Collected/Examined: [id number] 07:30PM       \par Verification Required       Stage: Final       \par  Performed at: [facility] at the [facility]       Resulted: [id number] 05:50PM       Last Updated: [id number] 06:23PM       Accession: [id number]

## 2021-07-26 NOTE — ASSESSMENT
[FreeTextEntry1] : Venipuncture with labs drawn in office\par I ordered a PET scan for further evaluation.\par I advised Giles to return to the office for pulmonary follow-up after PET scan has been performed.

## 2021-07-26 NOTE — CONSULT LETTER
[Dear  ___] : Dear  [unfilled], [Courtesy Letter:] : I had the pleasure of seeing your patient, [unfilled], in my office today. [Please see my note below.] : Please see my note below. [Consult Closing:] : Thank you very much for allowing me to participate in the care of this patient.  If you have any questions, please do not hesitate to contact me. [Sincerely,] : Sincerely, [FreeTextEntry3] : Dr. Jennifer Bradley [DrHayden  ___] : Dr. ARROYO

## 2021-07-26 NOTE — DISCUSSION/SUMMARY
[FreeTextEntry1] : Giles is a patient with an unchanged 20 x 8 mm lung nodule next to the left heart border, likely secondary to postinflammatory changes/scarring from the past tuberculosis infection, rule out collagen vascular disease, such as rheumatoid arthritis, rule out malignancy(unlikely) in this ex-cigarette smoker.

## 2021-07-26 NOTE — PROCEDURE
[FreeTextEntry1] : Pulmonary Function Test performed in my office today: Lung Volume: Within normal limits; Spirometry: Within normal limits; Diffusion: Within normal limits.\par \par  CT Angio Chest w/wo IV Cont             Final\par \par \par   EXAM:  CT ANGIO CHEST (W)AW IC\par \par \par PROCEDURE DATE:  05/04/2021\par \par \par \par INTERPRETATION:  EXAMINATION: CT ANGIO CHEST WITHOUT AND OR WITH IV CONTRAST\par \par CLINICAL INDICATION: Aortic repair.\par \par TECHNIQUE: CTA of the chest was obtained before and after the administration of 90 ml of Omnipaque 350, 10 ml was discarded.  MIP Images were reconstructed.\par \par COMPARISON: 2/26/2020.\par \par FINDINGS:\par \par AIRWAYS AND LUNGS: The central tracheobronchial tree is patent.  Dependent reticular opacity and mild bronchiectasis is unchanged. Unchanged 20 x 8 mm nodule along left heart border (3, 57).\par \par MEDIASTINUM AND PLEURA: There are no enlarged mediastinal, hilar or axillary lymph nodes. The visualized portion of the thyroid gland is unremarkable. There is no pleural effusion. There is no pneumothorax.\par \par HEART AND VESSELS: The heart is normal in size.  Descending thoracic aortic repair.  There is no pericardial effusion.  Lack of contrast within the proximal left subclavian artery with reconstitution distal to the vertebral artery.\par \par UPPER ABDOMEN: Images of the upper abdomen demonstrate no abnormality.\par \par BONES AND SOFT TISSUES: The bones are unremarkable.  The soft tissues are unremarkable.\par \par TUBES/LINES: None.\par \par IMPRESSION:\par Descending thoracic aortic repair.  Lack of contrast within the proximal left subclavian artery with reconstitution distal to the vertebral artery suggests subclavian steal syndrome.\par \par Unchanged 20 x 8 mm nodule along left heart border without definite enhancement may be within the pleura or pericardium. Continued follow-up CT recommended.\par \par \par \par \par \par \par LEDY KAYE MD; Attending Radiologist\par This document has been electronically signed. May  5 2021  6:20PM\par \par  \par \par  Ordered by: JOSH LEOS       Collected/Examined: [id number] 07:30PM       \par Verification Required       Stage: Final       \par  Performed at: [facility] at the [facility]       Resulted: [id number] 05:50PM       Last Updated: [id number] 06:23PM       Accession: [id number]
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
## 2021-08-01 LAB
ACE BLD-CCNC: 31 U/L
ALBUMIN SERPL ELPH-MCNC: 4.4 G/DL
ALP BLD-CCNC: 111 U/L
ALT SERPL-CCNC: 29 U/L
ALTERN TENCAPG(M6): 6.8 MCG/ML
ANA SER IF-ACNC: NEGATIVE
ANION GAP SERPL CALC-SCNC: 13 MMOL/L
ASPER FUMCAPG(M3): 9.3 MCG/ML
AST SERPL-CCNC: 17 U/L
AUREOBASCAPG(M12): 2.2 MCG/ML
BASOPHILS # BLD AUTO: 0.03 K/UL
BASOPHILS NFR BLD AUTO: 0.5 %
BILIRUB SERPL-MCNC: 0.3 MG/DL
BUN SERPL-MCNC: 24 MG/DL
CALCIUM SERPL-MCNC: 10 MG/DL
CHLORIDE SERPL-SCNC: 105 MMOL/L
CK SERPL-CCNC: 87 U/L
CO2 SERPL-SCNC: 24 MMOL/L
CREAT SERPL-MCNC: 1.02 MG/DL
CRP SERPL-MCNC: <3 MG/L
ENA JO1 AB SER IA-ACNC: <0.2 AL
ENA SCL70 IGG SER IA-ACNC: <0.2 AL
EOSINOPHIL # BLD AUTO: 0.11 K/UL
EOSINOPHIL NFR BLD AUTO: 1.9 %
ERYTHROCYTE [SEDIMENTATION RATE] IN BLOOD BY WESTERGREN METHOD: 5 MM/HR
GLUCOSE SERPL-MCNC: 84 MG/DL
HCT VFR BLD CALC: 51.1 %
HGB BLD-MCNC: 16.6 G/DL
IMM GRANULOCYTES NFR BLD AUTO: 0.2 %
LYMPHOCYTES # BLD AUTO: 1.23 K/UL
LYMPHOCYTES NFR BLD AUTO: 21.4 %
M TB IFN-G BLD-IMP: NEGATIVE
MAN DIFF?: NORMAL
MCHC RBC-ENTMCNC: 30.5 PG
MCHC RBC-ENTMCNC: 32.5 GM/DL
MCV RBC AUTO: 93.8 FL
MICROPOLYCAPG(M22): 4.8 MCG/ML
MONOCYTES # BLD AUTO: 0.32 K/UL
MONOCYTES NFR BLD AUTO: 5.6 %
MPO AB + PR3 PNL SER: NORMAL
NEUTROPHILS # BLD AUTO: 4.05 K/UL
NEUTROPHILS NFR BLD AUTO: 70.4 %
PENIC CHRYCAPG(M1): 8.3 MCG/ML
PHOMA BETAE IGG: <2 MCG/ML
PLATELET # BLD AUTO: 178 K/UL
POCT - HEMOGLOBIN (HGB), QUANTITATIVE, TRANSCUTANEOUS: 15.9
POTASSIUM SERPL-SCNC: 4.6 MMOL/L
PROT SERPL-MCNC: 6.5 G/DL
QUANTIFERON TB PLUS MITOGEN MINUS NIL: 9.71 IU/ML
QUANTIFERON TB PLUS NIL: 0.03 IU/ML
QUANTIFERON TB PLUS TB1 MINUS NIL: -0.01 IU/ML
QUANTIFERON TB PLUS TB2 MINUS NIL: -0.01 IU/ML
RBC # BLD: 5.45 M/UL
RBC # FLD: 13.8 %
RHEUMATOID FACT SER QL: <10 IU/ML
SODIUM SERPL-SCNC: 142 MMOL/L
THERMOCAPG(M23): 4.8 MCG/ML
TRICHODERMA VIRIDE IGG: 3.7 MCG/ML
WBC # FLD AUTO: 5.75 K/UL

## 2021-08-17 ENCOUNTER — APPOINTMENT (OUTPATIENT)
Dept: NUCLEAR MEDICINE | Facility: IMAGING CENTER | Age: 61
End: 2021-08-17

## 2022-05-02 ENCOUNTER — APPOINTMENT (OUTPATIENT)
Dept: VASCULAR SURGERY | Facility: CLINIC | Age: 62
End: 2022-05-02

## 2022-05-04 ENCOUNTER — APPOINTMENT (OUTPATIENT)
Dept: VASCULAR SURGERY | Facility: CLINIC | Age: 62
End: 2022-05-04